# Patient Record
(demographics unavailable — no encounter records)

---

## 2025-04-03 NOTE — RISK ASSESSMENT
[0] : 2) Feeling down, depressed, or hopeless: Not at all (0) [PHQ-2 Negative - No further assessment needed] : PHQ-2 Negative - No further assessment needed [PHQ-9 Negative - No further assessment needed] : PHQ-9 Negative - No further assessment needed [No Increased risk of SCA or SCD] : No Increased risk of SCA or SCD

## 2025-04-03 NOTE — HISTORY OF PRESENT ILLNESS
[Yes] : Patient goes to dentist yearly [Up to date] : Up to date [Eats meals with family] : eats meals with family [Has family members/adults to turn to for help] : has family members/adults to turn to for help [Is permitted and is able to make independent decisions] : Is permitted and is able to make independent decisions [Grade: ____] : Grade: [unfilled] [Normal Performance] : normal performance [Normal Behavior/Attention] : normal behavior/attention [Eats regular meals including adequate fruits and vegetables] : eats regular meals including adequate fruits and vegetables [Drinks non-sweetened liquids] : drinks non-sweetened liquids  [Has friends] : has friends [At least 1 hour of physical activity a day] : at least 1 hour of physical activity a day [Uses safety belts/safety equipment] : uses safety belts/safety equipment  [Has peer relationships free of violence] : has peer relationships free of violence [No] : Patient has not had sexual intercourse [Has ways to cope with stress] : has ways to cope with stress [Displays self-confidence] : displays self-confidence [With Teen] : teen [NO] : No [Mother] : mother

## 2025-04-03 NOTE — PHYSICAL EXAM
[Alert] : alert [No Acute Distress] : no acute distress [Normocephalic] : normocephalic [EOMI Bilateral] : EOMI bilateral [Clear tympanic membranes with bony landmarks and light reflex present bilaterally] : clear tympanic membranes with bony landmarks and light reflex present bilaterally  [Pink Nasal Mucosa] : pink nasal mucosa [Nonerythematous Oropharynx] : nonerythematous oropharynx [Supple, full passive range of motion] : supple, full passive range of motion [No Palpable Masses] : no palpable masses [Clear to Auscultation Bilaterally] : clear to auscultation bilaterally [Regular Rate and Rhythm] : regular rate and rhythm [Normal S1, S2 audible] : normal S1, S2 audible [No Murmurs] : no murmurs [+2 Femoral Pulses] : +2 femoral pulses [Soft] : soft [NonTender] : non tender [Non Distended] : non distended [Normoactive Bowel Sounds] : normoactive bowel sounds [No Hepatomegaly] : no hepatomegaly [No Splenomegaly] : no splenomegaly [No Abnormal Lymph Nodes Palpated] : no abnormal lymph nodes palpated [Normal Muscle Tone] : normal muscle tone [No Gait Asymmetry] : no gait asymmetry [No pain or deformities with palpation of bone, muscles, joints] : no pain or deformities with palpation of bone, muscles, joints [Straight] : straight [+2 Patella DTR] : +2 patella DTR [Cranial Nerves Grossly Intact] : cranial nerves grossly intact [Eugene: ____] : Eugene [unfilled] [Eugene: _____] : Eugene [unfilled] [Circumcised] : circumcised [Bilateral descended testes] : bilateral descended testes [No Testicular Masses] : no testicular masses

## 2025-04-05 NOTE — HISTORY OF PRESENT ILLNESS
[6] : 6 [5] : 5 [Ice] : ice [Nothing helps with pain getting better] : Nothing helps with pain getting better [de-identified] :  04/04/2025: RAMA SIN is a 16-year-old male here with LT ankle pain. Reports he was in gym class today when he jumped up for a ball and landed on the side of his left ankle. States the foot inverted on the landing. Took advil with relief. [] : no [FreeTextEntry5] : pt states he rolled and fell on LT ankle in gym today  [FreeTextEntry9] : ADVIL TEMPORARY RELIEF

## 2025-04-05 NOTE — IMAGING
[de-identified] :  Inspection: No significant swelling, no ecchymosis Palpation: mild tenderness to lateral malleolus Knee and Ankle Range of Motion: full Strength: 5/5 Neurovascular intact Gait: antalgic  XR taken negative for fracture

## 2025-04-05 NOTE — ASSESSMENT
[FreeTextEntry1] : 15 y/o M with ankle sprain  WBAT in CAM boot Ice/rest/elevation/NSAIDs No gym/sports follow up with foot/ankle 1 week

## 2025-04-14 NOTE — DISCUSSION/SUMMARY
[de-identified] : Patient has recovered well, and can slowly resume activities as tolerated.  Home stretching exercises were encouraged to maintain flexibility. Ice/nsaids can be used as needed. Patient will follow up as needed.

## 2025-04-14 NOTE — DATA REVIEWED
[Outside X-rays] : outside x-rays [Left] : left [Ankle] : ankle [I independently reviewed and interpreted images and report] : I independently reviewed and interpreted images and report [I reviewed the films/CD] : I reviewed the films/CD [FreeTextEntry1] : [facility]:  Pertinent findings include: No acute fractures or bony abnormalities noted. No acute fractures or bony abnormalities noted.

## 2025-04-14 NOTE — PHYSICAL EXAM
[Left] : left foot and ankle [NL (40)] : plantar flexion 40 degrees [NL 30)] : inversion 30 degrees [NL (20)] : eversion 20 degrees [2+] : posterior tibialis pulse: 2+ [Normal] : saphenous nerve sensation normal [5___] : inversion 5[unfilled]/5 [] : no pain when stressing lateral tarsal metatarsal joint [de-identified] : WB in CAM boot.

## 2025-04-14 NOTE — HISTORY OF PRESENT ILLNESS
[de-identified] : RAMA SIN is a 16-year-old male here with LT ankle pain. Reports twisting injury while in gym class on 4/4/25. Evaluated at [facility]. He has been WB in CAM boot which has helped. NSAIDS prn. History LT ankle fracture which resolved without issue.